# Patient Record
Sex: FEMALE | Race: WHITE | NOT HISPANIC OR LATINO | Employment: FULL TIME | URBAN - NONMETROPOLITAN AREA
[De-identification: names, ages, dates, MRNs, and addresses within clinical notes are randomized per-mention and may not be internally consistent; named-entity substitution may affect disease eponyms.]

---

## 2021-02-10 ENCOUNTER — OCCUPATIONAL MEDICINE (OUTPATIENT)
Dept: URGENT CARE | Facility: PHYSICIAN GROUP | Age: 26
End: 2021-02-10
Payer: COMMERCIAL

## 2021-02-10 VITALS
WEIGHT: 210 LBS | BODY MASS INDEX: 31.83 KG/M2 | HEIGHT: 68 IN | DIASTOLIC BLOOD PRESSURE: 82 MMHG | HEART RATE: 80 BPM | TEMPERATURE: 98 F | RESPIRATION RATE: 16 BRPM | SYSTOLIC BLOOD PRESSURE: 116 MMHG | OXYGEN SATURATION: 96 %

## 2021-02-10 DIAGNOSIS — S90.32XA CONTUSION OF LEFT FOOT, INITIAL ENCOUNTER: ICD-10-CM

## 2021-02-10 DIAGNOSIS — S91.302A WOUND OF LEFT FOOT: ICD-10-CM

## 2021-02-10 PROCEDURE — 99203 OFFICE O/P NEW LOW 30 MIN: CPT | Performed by: FAMILY MEDICINE

## 2021-02-10 RX ORDER — SERTRALINE HYDROCHLORIDE 25 MG/1
25 TABLET, FILM COATED ORAL DAILY
COMMUNITY

## 2021-02-10 RX ORDER — LORATADINE 10 MG/1
10 TABLET ORAL DAILY
COMMUNITY
End: 2022-01-10

## 2021-02-10 NOTE — PROGRESS NOTES
"Subjective:      Daysi Valencia is a 25 y.o. female who presents with Foot Injury (follow up from [place])      DOI 2/8/2021 UMESH: Sindy franco ran over Lt foot and caused a wound. Pain swelling since. Seen initially in [place] ER, says x-rays were done and nothing was broken      HPI    Review of Systems   Musculoskeletal: Positive for joint pain.   All other systems reviewed and are negative.         Objective:     /82 (BP Location: Right arm, Patient Position: Sitting, BP Cuff Size: Adult)   Pulse 80   Temp 36.7 °C (98 °F) (Temporal)   Resp 16   Ht 1.727 m (5' 8\")   Wt 95.3 kg (210 lb)   SpO2 96%   BMI 31.93 kg/m²      Physical Exam  Vitals and nursing note reviewed.   Constitutional:       General: She is not in acute distress.     Appearance: She is well-developed. She is not diaphoretic.   HENT:      Head: Normocephalic.   Cardiovascular:      Heart sounds: Normal heart sounds. No murmur.   Pulmonary:      Effort: Pulmonary effort is normal. No respiratory distress.   Musculoskeletal:        Feet:    Neurological:      Mental Status: She is alert.      Motor: No abnormal muscle tone.   Psychiatric:         Mood and Affect: Mood normal.         Behavior: Behavior normal.         Lt foot: well healing wound over dorsal aspect w/ some surrounding edema.       Assessment/Plan:            1. Contusion of left foot, initial encounter     2. Wound of left foot           ** SEDENTARY DUTY WITH STANDING/WALKING AS NEEDED FOR COMFORT **     Wound care discussed     5 day recheck, sooner if needed, ER if worse.   "

## 2021-02-10 NOTE — LETTER
"EMPLOYEE’S CLAIM FOR COMPENSATION/ REPORT OF INITIAL TREATMENT  FORM C-4    EMPLOYEE’S CLAIM - PROVIDE ALL INFORMATION REQUESTED   First Name  Daysi Last Name  Erik Birthdate                    1995                Sex  female Claim Number   Home Address  [address] Age  25 y.o. 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 (if applicable)  pulling pallets off of truck    How did this injury or occupational disease occur? (Be specific an answer in detail. Use additional sheet if necessary)  I was helping unload a grocery truck and the pallet and pallet salvador ran over my foot.   If you believe that you have an occupational disease, when did you first have knowledge of the disability and it relationship to your employment?  NA Witnesses to the Accident  Crystal, Dalia      Nature of Injury or Occupational Disease  Contusion  Part(s) of Body Injured or Affected  Foot (L), ,     I certify that the above is true and correct to the best of my knowledge and that I have provided this information in order to obtain " the benefits of [place]  I hereby authorize any physician, chiropractor, surgeon, practitioner, or other person, any hospital, including [facility] hospital, any medical service organization, any insurance company, or other institution or organization to release to each other, any medical or other information, including benefits paid or payable, pertinent to this injury or disease, except information relative to diagnosis, treatment and/or counseling for AIDS, psychological conditions, alcohol or controlled substances, for which I must give specific authorization.  A Photostat of this authorization shall be as valid as the original.     Date   Place   Employee’s Signature   THIS REPORT MUST BE COMPLETED AND MAILED WITHIN 3 WORKING DAYS OF TREATMENT   Place  [facility]  Name of Facility  [place]   Date  2/10/2021 Diagnosis  (S90.32XA) Contusion of left foot, initial encounter  (S91.302A) Wound of left foot Is there evidence the injured employee was under the              influence of alcohol and/or another controlled substance at the time of accident?   Hour  10:45 AM Description of Injury or Disease  Diagnoses of Contusion of left foot, initial encounter and Wound of left foot were pertinent to this visit. No   Treatment  Work restrictions  Have you advised the patient to remain off work five days or     more? No   X-Ray Findings      If Yes   From Date  To Date      From information given by the employee, together with medical evidence, can you directly connect this injury or occupational disease as job incurred?  Yes If No Full Duty    No Modified Duty  Yes   Is additional medical care by a physician indicated?  Yes If Modified Duty, Specify any Limitations / Restrictions  ** SEDENTARY DUTY WITH STANDING/WALKING AS NEEDED FOR COMFORT **   Do you know of any previous  "injury or disease contributing to this condition or occupational disease?                            No   Date  2/10/2021 Print Doctor’s Name   Isma Arias M.D. I certify the employer’s copy of  this form was mailed on:   Address  [address] Insurer’s Use Only     [facility]  [address]    Provider’s Tax ID Number  [id number] Telephone  Dept: [phone]      e-ISMA Rice M.D.  Signature:     Degree          ORIGINAL-TREATING PHYSICIAN OR CHIROPRACTOR    PAGE 2-INSURER/TPA    PAGE 3-EMPLOYER    PAGE 4-EMPLOYEE        Form C-4 (rev.10/07)           BRIEF DESCRIPTION OF RIGHTS AND BENEFITS  (Pursuant to [place]RS 616C.050)    Notice of Injury or Occupational Disease (Incident Report Form C-1): If an injury or occupational disease (OD) arises out of and in the course of employment, you must provide written notice to your employer as soon as practicable, but no later than 7 days after the accident or OD. Your employer shall maintain a sufficient supply of the required forms.    Claim for Compensation (Form C-4): If medical treatment is sought, the form C-4 is available at the place of initial treatment. A completed \"Claim for Compensation\" (Form C-4) must be filed within 90 days after an accident or OD. The treating physician or chiropractor must, within 3 working days after treatment, complete and mail to the employer, the employer's insurer and third-party , the Claim for Compensation.    Medical Treatment: If you require medical treatment for your on-the-job injury or OD, you may be required to select a physician or chiropractor from a list provided by your workers’ compensation insurer, if it has contracted with an Organization for Managed Care (MCO) or Preferred Provider Organization (PPO) or providers of health care. If your employer has not entered into a contract with an MCO or PPO, you may select a physician or chiropractor from the Panel of Physicians and " Chiropractors. Any medical costs related to your industrial injury or OD will be paid by your insurer.    Temporary Total Disability (TTD): If your doctor has certified that you are unable to work for a period of at least 5 consecutive days, or 5 cumulative days in a 20-day period, or places restrictions on you that your employer does not accommodate, you may be entitled to TTD compensation.    Temporary Partial Disability (TPD): If the wage you receive upon reemployment is less than the compensation for TTD to which you are entitled, the insurer may be required to pay you TPD compensation to make up the difference. TPD can only be paid for a maximum of 24 months.    Permanent Partial Disability (PPD): When your medical condition is stable and there is an indication of a PPD as a result of your injury or OD, within 30 days, your insurer must arrange for an evaluation by a rating physician or chiropractor to determine the degree of your PPD. The amount of your PPD award depends on the date of injury, the results of the PPD evaluation, your age and wage.    Permanent Total Disability (PTD): If you are medically certified by a treating physician or chiropractor as permanently and totally disabled and have been granted a PTD status by your insurer, you are entitled to receive monthly benefits not to exceed 66 2/3% of your average monthly wage. The amount of your PTD payments is subject to reduction if you previously received a lump-sum PPD award.    Vocational Rehabilitation Services: You may be eligible for vocational rehabilitation services if you are unable to return to the job due to a permanent physical impairment or permanent restrictions as a result of your injury or occupational disease.    Transportation and Per Celsa Reimbursement: You may be eligible for travel expenses and per celsa associated with medical treatment.    Reopening: You may be able to reopen your claim if your condition worsens after claim  closure.     Appeal Process: If you disagree with a written determination issued by the insurer or the insurer does not respond to your request, you may appeal to the Department of Administration, , by following the instructions contained in your determination letter. You must appeal the determination within 70 days from the date of the determination letter at [address], [address], [place], [place] [address], or [address], [address], [place], [place] [address]. If you disagree with the  decision, you may appeal to the Department of Administration, . You must file your appeal within 30 days from the date of the  decision letter at [address], [address], [place], [place] [address], or [address], [address], [place], [place] [address]. If you disagree with a decision of an , you may file a petition for judicial review with the District Court. You must do so within 30 days of the Appeal Officer’s decision. You may be represented by an  at your own expense or you may contact the [facility] for possible representation.    [place]  for Injured Workers (NAIW): If you disagree with a  decision, you may request that NAIW represent you without charge at an  Hearing. For information regarding denial of benefits, you may contact the [facility] at: [address], [address], [place] [address], [phone], or [address], [address], [place] [address], [phone]    To File a Complaint with the Division: If you wish to file a complaint with the  of the Division of Industrial Relations (DIR),  please contact the Workers’ Compensation Section, [address], [place], [place] [address], telephone [phone], or [address], [place], [place] [address], telephone [phone].    For assistance with Workers’  Compensation Issues: You may contact the [facility] Office for Consumer Health Assistance, [facility] [facility], [place] [address], [place] [address], Toll Free 1-428.812.8081, Web site: http://[facility].[place].gov/Programs/AURY E-mail: [email or web address].[facility]              __________________________________________________________________                                    _________________            Employee Name / Signature                                                                                                                            Date                                                                                                                                                                                                                              D-2 (rev. 10/20)

## 2021-02-10 NOTE — LETTER
[facility]  [address] [place] [address] [place] [address]  Phone:  [phone] - Fax:  [phone]   [facility] Progress Report and Disability Certification  Date of Service: 2/10/2021   No Show:  No  Date / Time of Next Visit: 2/15/2021   Claim Information   Patient Name: Daysi Valencia  Claim Number:     Employer: [employer]  Date of Injury: 2/8/2021     Insurer / TPA: [employer]  ID / SSN:     Occupation: Stocking   Diagnosis: Diagnoses of Contusion of left foot, initial encounter and Wound of left foot were pertinent to this visit.    Medical Information   Related to Industrial Injury? Yes    Subjective Complaints:  DOI 2/8/2021 UMESH: Sindy franco ran over Lt foot and caused a wound. Pain swelling since. Seen initially in [place] ER, says x-rays were done and nothing was broken    Objective Findings: Lt foot: well healing wound over dorsal aspect w/ some surrounding edema.   Pre-Existing Condition(s):     Assessment:   Initial Visit    Status: Additional Care Required  Permanent Disability:No    Plan:      Diagnostics:      Comments:       Disability Information   Status: Released to Restricted Duty    From:  2/10/2021  Through: 2/15/2021 Restrictions are: Temporary   Physical Restrictions   Sitting:    Standing:    Stooping:    Bending:      Squatting:    Walking:    Climbing:    Pushing:      Pulling:    Other:    Reaching Above Shoulder (L):   Reaching Above Shoulder (R):       Reaching Below Shoulder (L):    Reaching Below Shoulder (R):      Not to exceed Weight Limits   Carrying(hrs):   Weight Limit(lb):   Lifting(hrs):   Weight  Limit(lb):     Comments: ** SEDENTARY DUTY WITH STANDING/WALKING AS NEEDED FOR COMFORT **    Repetitive Actions   Hands: i.e. Fine Manipulations from Grasping:     Feet: i.e. Operating Foot Controls:     Driving / Operate Machinery:     Provider Name:   Isma Arias M.D. Physician Signature:  Physician Name:      Clinic Name / Location: [address] [place] [address] Clinic Phone Number: Dept: [phone]   Appointment Time: 9:55 Am Visit Start Time: 10:45 AM   Check-In Time:  9:59 Am Visit Discharge Time:  11:13 AM   Original-Treating Physician or Chiropractor    Page 2-Insurer/TPA    Page 3-Employer    Page 4-Employee

## 2021-02-15 ENCOUNTER — OCCUPATIONAL MEDICINE (OUTPATIENT)
Dept: URGENT CARE | Facility: PHYSICIAN GROUP | Age: 26
End: 2021-02-15
Payer: COMMERCIAL

## 2021-02-15 VITALS
DIASTOLIC BLOOD PRESSURE: 74 MMHG | WEIGHT: 210 LBS | BODY MASS INDEX: 31.83 KG/M2 | SYSTOLIC BLOOD PRESSURE: 116 MMHG | TEMPERATURE: 97.6 F | RESPIRATION RATE: 16 BRPM | HEART RATE: 104 BPM | OXYGEN SATURATION: 100 % | HEIGHT: 68 IN

## 2021-02-15 DIAGNOSIS — S90.32XD CONTUSION OF LEFT FOOT, SUBSEQUENT ENCOUNTER: ICD-10-CM

## 2021-02-15 DIAGNOSIS — L03.90 WOUND CELLULITIS: ICD-10-CM

## 2021-02-15 DIAGNOSIS — S91.302A WOUND OF LEFT FOOT: ICD-10-CM

## 2021-02-15 PROCEDURE — 99213 OFFICE O/P EST LOW 20 MIN: CPT | Performed by: PHYSICIAN ASSISTANT

## 2021-02-15 RX ORDER — CEPHALEXIN 500 MG/1
500 CAPSULE ORAL 3 TIMES DAILY
Qty: 30 CAPSULE | Refills: 0 | Status: SHIPPED | OUTPATIENT
Start: 2021-02-15 | End: 2021-02-25

## 2021-02-15 NOTE — PROGRESS NOTES
Chief Complaint   Patient presents with   • Follow-Up       HISTORY OF PRESENT ILLNESS: Patient is a 25 y.o. female who presents today because is here for a Worker's Comp. follow-up visit.    Date of injury 2/8/2021.  A pallet salvador ran over her left foot caused a wound and pain.  She was initially seen at a local emergency room and reports that she had negative x-rays.  She was seen 5 days ago in this urgent care clinic by another provider and was still having some pain in her left foot.  Since her last visit 5 days ago was a lot of pain around an open wound on the top of her left foot, sometimes tingling sensation with certain movements and walking.  She has not been taking any medications for her symptoms    There are no problems to display for this patient.      Allergies:Patient has no known allergies.    Current Outpatient Medications Ordered in Epic   Medication Sig Dispense Refill   • cephALEXin (KEFLEX) 500 MG Cap Take 1 capsule by mouth 3 times a day for 10 days. 30 capsule 0   • sertraline (ZOLOFT) 25 MG tablet Take 25 mg by mouth every day.     • loratadine (CLARITIN) 10 MG Tab Take 10 mg by mouth every day.       No current [facility]-ordered facility-administered medications on file.       History reviewed. No pertinent past medical history.    Social History     Tobacco Use   • Smoking status: Never Smoker   • Smokeless tobacco: Never Used   Substance Use Topics   • Alcohol use: Not on file   • Drug use: Not on file       No family status information on file.   History reviewed. No pertinent family history.    ROS:  Review of Systems   Constitutional: Negative for fever, chills, weight loss and malaise/fatigue.   HENT: Negative for ear pain, nosebleeds, congestion, sore throat and neck pain.    Eyes: Negative for blurred vision.   Respiratory: Negative for cough, sputum production, shortness of breath and wheezing.    Cardiovascular: Negative for chest pain, palpitations, orthopnea and leg swelling.  "  Gastrointestinal: Negative for heartburn, nausea, vomiting and abdominal pain.   Genitourinary: Negative for dysuria, urgency and frequency.     Exam:  /74 (BP Location: Right arm, Patient Position: Sitting, BP Cuff Size: Large adult)   Pulse (!) 104   Temp 36.4 °C (97.6 °F) (Temporal)   Resp 16   Ht 1.727 m (5' 8\")   Wt 95.3 kg (210 lb)   SpO2 100%   General:  Well nourished, well developed female in NAD  Head:Normocephalic, atraumatic  Eyes: PERRLA, EOM within normal limits, no conjunctival injection, no scleral icterus, visual fields and acuity grossly intact.  Nose: Symmetrical without tenderness, no discharge.  Mouth: reasonable hygiene, no erythema exudates or tonsillar enlargement.  Neck: no masses, range of motion within normal limits, no tracheal deviation. No obvious thyroid enlargement.  Extremities: no clubbing, cyanosis.  There is a 2 cm x 2 cm diameter area of open wound on the top of her left foot with some surrounding edema and erythema and exquisite tenderness.  There is a small amount of yellow drainage.  Distally she has good range of motion, strength, circulation and sensation.    Please note that this dictation was created using voice recognition software. I have made every reasonable attempt to correct obvious errors, but I expect that there are errors of grammar and possibly content that I did not discover before finalizing the note.    Assessment/Plan:  1. Contusion of left foot, subsequent encounter     2. Wound of left foot     3. Wound cellulitis  cephALEXin (KEFLEX) 500 MG Cap       Wound was cleaned and dressed in the office, wound care instructions were given.  Will cover for cellulitis, continue restrictions, follow-up in a week  Use over-the-counter ibuprofen as needed    "

## 2021-02-15 NOTE — LETTER
[facility]  [address] [place] [address] [place] [address]  Phone:  [phone] - Fax:  [phone]   [facility]ational [facility] Progress Report and Disability Certification  Date of Service: 2/15/2021   No Show:  No  Date / Time of Next Visit: 2021   Claim Information   Patient Name: Daysi Valencia  Claim Number:     Employer: [employer]  Date of Injury: 2021     Insurer / TPA: [employer]  ID / SSN:     Occupation: Stocking   Diagnosis: Diagnoses of Contusion of left foot, subsequent encounter, Wound of left foot, and Wound cellulitis were pertinent to this visit.    Medical Information   Related to Industrial Injury? Yes    Subjective Complaints:  Continuing pain and wound of left foot   Objective Findings:  There is a 2 cm x 2 cm diameter area of open wound on the top of her left foot with some surrounding edema and erythema and exquisite tenderness.  There is a small amount of yellow drainage.  Distally she has good range of motion, strength, circulation and sensation.     Pre-Existing Condition(s):     Assessment:   Condition Worsened    Status: Additional Care Required  Comments:Follow-up in 1 week  Permanent Disability:No    Plan: Medication  Comments:Prescription antibiotics, over-the-counter anti-inflammatories    Diagnostics:      Comments:       Disability Information   Status: Released to Restricted Duty    From:  2/15/2021  Through: 2021 Restrictions are:     Physical Restrictions   Sitting:    Standing:    Stooping:    Bending:      Squatting:    Walking:  < or = to 1 hr/day Climbin hrs/day Pushin hrs/day   Pullin hrs/day Other:    Reaching Above Shoulder (L):   Reaching Above Shoulder (R):       Reaching Below Shoulder (L):    Reaching Below Shoulder (R):      Not to exceed Weight Limits   Carrying(hrs):   Weight Limit(lb):   Lifting(hrs):   Weight  Limit(lb):     Comments: Minimal walking.  Walking only as tolerated.   Otherwise sedentary/sitting only    Repetitive Actions   Hands: i.e. Fine Manipulations from Grasping:     Feet: i.e. Operating Foot Controls:     Driving / Operate Machinery:     Provider Name:   Law Abreu P.A.-C. Physician Signature:  Physician Name:     Clinic Name / Location: [address] [address] Clinic Phone Number: Dept: [phone]   Appointment Time: 9:20 Am Visit Start Time: 9:16 AM   Check-In Time:  9:14 Am Visit Discharge Time:  9:47 AM   Original-Treating Physician or Chiropractor    Page 2-Insurer/TPA    Page 3-Employer    Page 4-Employee

## 2021-02-22 ENCOUNTER — OCCUPATIONAL MEDICINE (OUTPATIENT)
Dept: URGENT CARE | Facility: PHYSICIAN GROUP | Age: 26
End: 2021-02-22
Payer: COMMERCIAL

## 2021-02-22 VITALS
OXYGEN SATURATION: 98 % | SYSTOLIC BLOOD PRESSURE: 116 MMHG | HEART RATE: 92 BPM | TEMPERATURE: 97.6 F | BODY MASS INDEX: 31.83 KG/M2 | HEIGHT: 68 IN | WEIGHT: 210 LBS | DIASTOLIC BLOOD PRESSURE: 84 MMHG | RESPIRATION RATE: 16 BRPM

## 2021-02-22 DIAGNOSIS — L03.90 WOUND CELLULITIS: ICD-10-CM

## 2021-02-22 DIAGNOSIS — S90.32XD CONTUSION OF LEFT FOOT, SUBSEQUENT ENCOUNTER: ICD-10-CM

## 2021-02-22 PROCEDURE — 99213 OFFICE O/P EST LOW 20 MIN: CPT | Performed by: PHYSICIAN ASSISTANT

## 2021-02-22 NOTE — PROGRESS NOTES
Chief Complaint   Patient presents with   • Follow-Up       HISTORY OF PRESENT ILLNESS: Patient is a 25 y.o. female who presents today because she is here for a Worker's Comp. follow-up visit.    Date of injury 2/8/2021. A pallet salvador ran over her left foot caused a wound and pain. She was initially seen at a local emergency room and reports that she had negative x-rays. She was seen 1 week ago in this urgent care clinic and having worsening pain around the open area on the top of her left foot.  She was treated for wound cellulitis.  Since her last visit 1 week ago, she has had improvement in her pain but wound areas still open, no longer draining, no longer red      There are no problems to display for this patient.      Allergies:Patient has no known allergies.    Current Outpatient Medications Ordered in Epic   Medication Sig Dispense Refill   • cephALEXin (KEFLEX) 500 MG Cap Take 1 capsule by mouth 3 times a day for 10 days. 30 capsule 0   • sertraline (ZOLOFT) 25 MG tablet Take 25 mg by mouth every day.     • loratadine (CLARITIN) 10 MG Tab Take 10 mg by mouth every day.       No current [facility]-ordered facility-administered medications on file.       History reviewed. No pertinent past medical history.    Social History     Tobacco Use   • Smoking status: Never Smoker   • Smokeless tobacco: Never Used   Substance Use Topics   • Alcohol use: Not on file   • Drug use: Not on file       No family status information on file.   History reviewed. No pertinent family history.    ROS:  Review of Systems   Constitutional: Negative for fever, chills, weight loss and malaise/fatigue.   HENT: Negative for ear pain, nosebleeds, congestion, sore throat and neck pain.    Eyes: Negative for blurred vision.   Respiratory: Negative for cough, sputum production, shortness of breath and wheezing.    Cardiovascular: Negative for chest pain, palpitations, orthopnea and leg swelling.   Gastrointestinal: Negative for heartburn, nausea,  "vomiting and abdominal pain.   Genitourinary: Negative for dysuria, urgency and frequency.     Exam:  /84 (BP Location: Right arm, Patient Position: Sitting, BP Cuff Size: Adult)   Pulse 92   Temp 36.4 °C (97.6 °F) (Temporal)   Resp 16   Ht 1.727 m (5' 8\")   Wt 95.3 kg (210 lb)   SpO2 98%   General:  Well nourished, well developed female in NAD  Head:Normocephalic, atraumatic  Eyes: PERRLA, EOM within normal limits, no conjunctival injection, no scleral icterus, visual fields and acuity grossly intact.  Nose: Symmetrical without tenderness, no discharge.  Mouth: reasonable hygiene, no erythema exudates or tonsillar enlargement.  Neck: no masses, range of motion within normal limits, no tracheal deviation. No obvious thyroid enlargement.  Extremities: no clubbing, cyanosis, or edema.  Top of her left foot she still has a 2 cm x 2 cm diameter area of open wound, no surrounding erythema, minimal surrounding tenderness.    Please note that this dictation was created using voice recognition software. I have made every reasonable attempt to correct obvious errors, but I expect that there are errors of grammar and possibly content that I did not discover before finalizing the note.    Assessment/Plan:  1. Contusion of left foot, subsequent encounter     2. Wound cellulitis         Continue current, continue current restrictions, follow-up in a week       "

## 2021-02-22 NOTE — LETTER
[facility]  [address] [place] [address] [place] [address]  Phone:  [phone] - Fax:  [phone]   [facility] Progress Report and Disability Certification  Date of Service: 2/22/2021   No Show:  No  Date / Time of Next Visit: 3/1/2021   Claim Information   Patient Name: Daysi Valencia  Claim Number:     Employer: [employer]  Date of Injury: 2/8/2021     Insurer / TPA: [employer]  ID / SSN:     Occupation: Stocking   Diagnosis: Diagnoses of Contusion of left foot, subsequent encounter and Wound cellulitis were pertinent to this visit.    Medical Information   Related to Industrial Injury? Yes    Subjective Complaints:  Improving left foot wound and contusion   Objective Findings: Extremities: no clubbing, cyanosis, or edema.  Top of her left foot she still has a 2 cm x 2 cm diameter area of open wound, no surrounding erythema, minimal surrounding tenderness.   Pre-Existing Condition(s):     Assessment:   Condition Improved    Status: Additional Care Required  Comments:Follow-up in 1 week  Permanent Disability:No    Plan:      Diagnostics:      Comments:       Disability Information   Status: Released to Restricted Duty    From:  2/22/2021  Through: 3/1/2021 Restrictions are: Temporary   Physical Restrictions   Sitting:    Standing:    Stooping:    Bending:      Squatting:    Walking:    Climbing:    Pushing:      Pulling:    Other:    Reaching Above Shoulder (L):   Reaching Above Shoulder (R):       Reaching Below Shoulder (L):    Reaching Below Shoulder (R):      Not to exceed Weight Limits   Carrying(hrs):   Weight Limit(lb):   Lifting(hrs):   Weight  Limit(lb):     Comments: Primarily sedentary work only, walking and standing only as tolerated    Repetitive Actions   Hands: i.e. Fine Manipulations from Grasping:     Feet: i.e. Operating Foot Controls:     Driving / Operate Machinery:     Provider Name:   Law Abreu P.A.-C. Physician  Signature:  Physician Name:     Clinic Name / Location: [facility] [address] [place] [address] Clinic Phone Number: Dept: [phone]   Appointment Time: 9:20 Am Visit Start Time: 9:04 AM   Check-In Time:  9:04 Am Visit Discharge Time:  9:52 am   Original-Treating Physician or Chiropractor    Page 2-Insurer/TPA    Page 3-Employer    Page 4-Employee

## 2021-03-01 ENCOUNTER — OCCUPATIONAL MEDICINE (OUTPATIENT)
Dept: OCCUPATIONAL MEDICINE | Facility: CLINIC | Age: 26
End: 2021-03-01
Payer: COMMERCIAL

## 2021-03-01 VITALS
SYSTOLIC BLOOD PRESSURE: 118 MMHG | HEART RATE: 82 BPM | BODY MASS INDEX: 30.06 KG/M2 | RESPIRATION RATE: 14 BRPM | DIASTOLIC BLOOD PRESSURE: 74 MMHG | TEMPERATURE: 98.6 F | HEIGHT: 70 IN | OXYGEN SATURATION: 95 % | WEIGHT: 210 LBS

## 2021-03-01 DIAGNOSIS — L03.90 WOUND CELLULITIS: ICD-10-CM

## 2021-03-01 DIAGNOSIS — S90.32XD CONTUSION OF LEFT FOOT, SUBSEQUENT ENCOUNTER: ICD-10-CM

## 2021-03-01 DIAGNOSIS — S91.302D WOUND, OPEN, FOOT WITH COMPLICATION, LEFT, SUBSEQUENT ENCOUNTER: ICD-10-CM

## 2021-03-01 PROBLEM — S90.122A CONTUSION OF FIFTH TOE OF LEFT FOOT: Status: ACTIVE | Noted: 2021-03-01

## 2021-03-01 PROCEDURE — 99214 OFFICE O/P EST MOD 30 MIN: CPT | Performed by: NURSE PRACTITIONER

## 2021-03-01 RX ORDER — SULFAMETHOXAZOLE AND TRIMETHOPRIM 800; 160 MG/1; MG/1
1 TABLET ORAL 2 TIMES DAILY
Qty: 10 TABLET | Refills: 0 | Status: SHIPPED | OUTPATIENT
Start: 2021-03-01 | End: 2021-03-06

## 2021-03-01 ASSESSMENT — PAIN SCALES - GENERAL: PAINLEVEL: 3=SLIGHT PAIN
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
## 2021-03-08 ENCOUNTER — OCCUPATIONAL MEDICINE (OUTPATIENT)
Dept: OCCUPATIONAL MEDICINE | Facility: CLINIC | Age: 26
End: 2021-03-08
Payer: COMMERCIAL

## 2021-03-08 VITALS
BODY MASS INDEX: 34.71 KG/M2 | DIASTOLIC BLOOD PRESSURE: 88 MMHG | HEIGHT: 68 IN | WEIGHT: 229 LBS | SYSTOLIC BLOOD PRESSURE: 112 MMHG | HEART RATE: 74 BPM | TEMPERATURE: 97.5 F | OXYGEN SATURATION: 97 %

## 2021-03-08 DIAGNOSIS — L03.90 WOUND CELLULITIS: ICD-10-CM

## 2021-03-08 DIAGNOSIS — S91.302D WOUND, OPEN, FOOT WITH COMPLICATION, LEFT, SUBSEQUENT ENCOUNTER: ICD-10-CM

## 2021-03-08 DIAGNOSIS — S90.32XD CONTUSION OF LEFT FOOT, SUBSEQUENT ENCOUNTER: ICD-10-CM

## 2021-03-08 PROCEDURE — 99213 OFFICE O/P EST LOW 20 MIN: CPT | Performed by: NURSE PRACTITIONER

## 2021-03-08 RX ORDER — DOXYCYCLINE HYCLATE 100 MG
100 TABLET ORAL 2 TIMES DAILY
Qty: 14 TABLET | Refills: 0 | Status: SHIPPED | OUTPATIENT
Start: 2021-03-08 | End: 2021-03-15

## 2021-03-08 ASSESSMENT — ENCOUNTER SYMPTOMS
PSYCHIATRIC NEGATIVE: 1
CARDIOVASCULAR NEGATIVE: 1
RESPIRATORY NEGATIVE: 1
NEUROLOGICAL NEGATIVE: 1
MYALGIAS: 1
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
## 2021-03-09 ENCOUNTER — NON-PROVIDER VISIT (OUTPATIENT)
Dept: WOUND CARE | Facility: MEDICAL CENTER | Age: 26
End: 2021-03-09
Attending: NURSE PRACTITIONER
Payer: COMMERCIAL

## 2021-03-09 PROCEDURE — 99211 OFF/OP EST MAY X REQ PHY/QHP: CPT

## 2021-03-09 PROCEDURE — 97597 DBRDMT OPN WND 1ST 20 CM/<: CPT

## 2021-03-09 NOTE — PATIENT INSTRUCTIONS
Rinse wound bed with normal saline then pat dry with gauze. Apply skin prep to skin around the wound and where tape will go on. Apply collagen dressing (Anastasiia) moistened with normal saline to wound bed as directed by provider. Cover wound with silver hydrofiber dressing (Aquacle Ag Advantage) then foam (foam side down to wound) with tape and change dressing every 2~3 days or if it becomes over saturated, soiled or falls off.    Keep dressing clean and dry and cover while bathing. Only change dressing if over saturated, soiled or its falling off.     Should you experience any significant changes in your wound(s) such as infection (redness, swelling, localized heat, increased pain, fever >101 F, chills) or have any questions regarding your home care instructions, please contact the wound center [phone]. If after hours, contact your primary care physician or go the hospital emergency room.
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
## 2021-03-18 ENCOUNTER — OFFICE VISIT (OUTPATIENT)
Dept: WOUND CARE | Facility: MEDICAL CENTER | Age: 26
End: 2021-03-18
Attending: NURSE PRACTITIONER
Payer: COMMERCIAL

## 2021-03-18 VITALS
TEMPERATURE: 97.4 F | SYSTOLIC BLOOD PRESSURE: 115 MMHG | DIASTOLIC BLOOD PRESSURE: 72 MMHG | RESPIRATION RATE: 20 BRPM | OXYGEN SATURATION: 100 % | HEART RATE: 83 BPM

## 2021-03-18 DIAGNOSIS — R60.0 EDEMA OF LEFT FOOT: ICD-10-CM

## 2021-03-18 DIAGNOSIS — R52 PAIN ASSOCIATED WITH WOUND: ICD-10-CM

## 2021-03-18 DIAGNOSIS — S91.302D OPEN WOUND OF LEFT FOOT, SUBSEQUENT ENCOUNTER: Primary | ICD-10-CM

## 2021-03-18 DIAGNOSIS — T14.8XXA PAIN ASSOCIATED WITH WOUND: ICD-10-CM

## 2021-03-18 PROCEDURE — 11042 DBRDMT SUBQ TIS 1ST 20SQCM/<: CPT | Performed by: NURSE PRACTITIONER

## 2021-03-18 PROCEDURE — 11042 DBRDMT SUBQ TIS 1ST 20SQCM/<: CPT

## 2021-03-18 PROCEDURE — 99213 OFFICE O/P EST LOW 20 MIN: CPT

## 2021-03-18 ASSESSMENT — PAIN SCALES - GENERAL: PAINLEVEL: 5=MODERATE PAIN

## 2021-03-18 NOTE — PROGRESS NOTES
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  She was seen at [facility] in [place].  An x-ray was done, no fractures.  Followed up with occupational health multiple times, was prescribed Keflex, and later Bactrim and then doxycycline.  He was also advised to apply antibiotic ointment and Band-Aids to wound on the dorsum of her foot.  Her wound failed to progress, and she was referred to [facility] for management..      Pertinent Medical History: No significant past medical history    TOBACCO USE: She has never smoked or use smokeless tobacco    Patient's problem list, allergies, and current medications reviewed and updated in Epic    Interval History:  3/18/2021: Initial provider visit with LUIS Clement.  Patient states she is feeling well, denies fevers, chills, nausea, vomiting, cough or shortness of breath.  She has been working, wearing regular shoe.  Reports she does not have significant pain from her wound.  She feels it has improved since beginning wound care.      REVIEW OF SYSTEMS:   Review of Systems   Constitutional: Negative for fever.   Respiratory: Negative for cough and shortness of breath.    Cardiovascular: Negative for claudication and leg swelling.   Gastrointestinal: Negative for constipation, diarrhea, nausea and vomiting.   Musculoskeletal: Negative for joint pain.   Skin: Negative for itching and rash.   Psychiatric/Behavioral: Negative for depression. The patient is not nervous/anxious.        PHYSICAL EXAMINATION:   /72   Pulse 83   Temp 36.3 °C (97.4 °F)   Resp 20   SpO2 100%     Physical Exam   Constitutional: She is oriented to person, place, and time and well-developed,  well-nourished, and in no distress.   Obese   HENT:   Head: Normocephalic.   Eyes: Pupils are equal, round, and reactive to light.   Cardiovascular: Intact distal pulses.   Pulmonary/Chest: Effort normal.   Musculoskeletal:         General: Edema present. Normal range of motion.      Comments: Edema dorsum of left foot, nonpitting   Neurological: She is alert and oriented to person, place, and time.   Skin: Skin is warm.   Full-thickness wound to dorsum of left foot  Refer to wound flowsheet and photos   Psychiatric: Mood, memory, affect and judgment normal.       WOUND ASSESSMENT     Wound 03/09/21 Traumatic Left Dorsal Foot (Active)   Wound Image    03/18/21 1400   Site Assessment Yellow;Red 03/18/21 1400   Periwound Assessment Hemosiderin Staining;Scar tissue 03/18/21 1400   Margins Unattached edges 03/18/21 1400   Closure Secondary intention 03/09/21 1030   Drainage Amount Small 03/18/21 1400   Drainage Description Serosanguineous 03/18/21 1400   Treatments Cleansed;Topical Lidocaine;Provider debridement 03/18/21 1400   Wound Cleansing Normal Saline Irrigation 03/18/21 1400   Periwound Protectant Skin Protectant Wipes to Periwound;Barrier Paste 03/18/21 1400   Dressing Cleansing/Solutions Normal Saline 03/18/21 1400   Dressing Options Collagen Dressing;Hydrofiber Silver;Nonadhesive Foam;Hypafix Tape 03/18/21 1400   Dressing Changed New 03/18/21 1400   Dressing Status Clean;Intact;Dry 03/18/21 1400   Dressing Change/Treatment Frequency Every 48 hrs, and As Needed 03/18/21 1400   Non-staged Wound Description Full thickness 03/18/21 1400   Wound Length (cm) 0.8 cm 03/18/21 1400   Wound Width (cm) 1 cm 03/18/21 1400   Wound Depth (cm) 0.1 cm 03/18/21 1400   Wound Surface Area (cm^2) 0.8 cm^2 03/18/21 1400   Wound Volume (cm^3) 0.08 cm^3 03/18/21 1400   Post-Procedure Length (cm) 0.9 cm 03/18/21 1400   Post-Procedure Width (cm) 1 cm 03/18/21 1400   Post-Procedure Depth (cm) 0.2 cm 03/18/21 1400   Post-Procedure  Surface Area (cm^2) 0.9 cm^2 03/18/21 1400   Post-Procedure Volume (cm^3) 0.18 cm^3 03/18/21 1400   Wound Bed Slough % - (Post-Procedure) 10 % 03/09/21 1030   Tunneling (cm) 0 cm 03/18/21 1400   Undermining (cm) 0 cm 03/18/21 1400   Wound Odor None 03/18/21 1400   Pulses Left;DP;PT;2+ 03/09/21 1030   Exposed Structures None 03/18/21 1400        PROCEDURE:   -2% viscous lidocaine applied topically to wound bed for approximately 5 minutes prior to debridement  -Curette used to debride wound bed.  Excisional debridement was performed to remove devitalized tissue until healthy, bleeding tissue was visualized.   Entire surface of wound, 0.9 cm2 debrided.  Tissue debrided into the subcutaneous layer.    -Bleeding controlled with manual pressure.    -Wound care completed by wound RN, refer to flowsheet  -Patient tolerated the procedure well, without c/o pain or discomfort.       Pertinent Labs and Diagnostics:    Labs:     A1c: No results found for: HBA1C       IMAGING: None found in epic.  X-[facility] from [facility] reportedly negative for fracture    VASCULAR STUDIES: N/A    LAST  WOUND CULTURE:  DATE : None found in epic            ASSESSMENT AND PLAN:     1. Open wound of left foot, subsequent encounter  Comments: Full-thickness wound caused by trauma, patient pulled pallet salvador onto her foot.    3/18/2021: Wound area has decreased since last assessment.  Thin layer of slough on wound bed  -Excisional debridement of wound in clinic today, medically necessary to promote wound healing.  -Patient to return to clinic weekly for assessment and debridement  -Patient to change dressing 1-2 times per week in between clinic visits  -Patient's age and apparent good health, anticipate full resolution of this wound within 14 to 21 days    Wound care: Collagen to accelerate granulation, Hydrofiber silver to manage bioburden and exudate, foam cover dressing, Hypafix tape    2. Edema of left foot    3/18/2021: Nonpitting  edema to dorsum of left foot  -Tubigrip to manage edema    3. Pain associated with wound    3/18/2021: Patient reports little pain from her wound, except with wound care  -2% viscous lidocaine applied topically to wound bed for approximately 5 minutes prior to debridement  -Patient tolerated procedure today with no complaints of discomfort.          PATIENT EDUCATION  - Importance of adequate nutrition for wound healing  -Advised to go to ER for any increased redness, swelling, drainage, or odor, or if patient develops fever, chills, nausea or vomiting.     20 min spent face to face with patient, >50% of time spent counseling, coordinating care, reviewing records, discussing POC, educating patient regarding wound healing and progression.  This time was spent in excess to procedure time.       Please note that this note may have been created using voice recognition software. I have worked with technical experts from [facility] [employer] to optimize the interface.  I have made every reasonable attempt to correct obvious errors, but there may be errors of grammar and possibly content that I did not discover before finalizing the note.    N

## 2021-03-19 ASSESSMENT — ENCOUNTER SYMPTOMS
DIARRHEA: 0
COUGH: 0
DEPRESSION: 0
VOMITING: 0
CONSTIPATION: 0
SHORTNESS OF BREATH: 0
NERVOUS/ANXIOUS: 0
NAUSEA: 0
CLAUDICATION: 0
FEVER: 0

## 2021-03-22 ENCOUNTER — OCCUPATIONAL MEDICINE (OUTPATIENT)
Dept: OCCUPATIONAL MEDICINE | Facility: CLINIC | Age: 26
End: 2021-03-22
Payer: COMMERCIAL

## 2021-03-22 VITALS
BODY MASS INDEX: 34.71 KG/M2 | OXYGEN SATURATION: 98 % | HEIGHT: 68 IN | HEART RATE: 91 BPM | DIASTOLIC BLOOD PRESSURE: 64 MMHG | SYSTOLIC BLOOD PRESSURE: 118 MMHG | WEIGHT: 229 LBS | TEMPERATURE: 96.4 F

## 2021-03-22 DIAGNOSIS — S91.302D WOUND, OPEN, FOOT WITH COMPLICATION, LEFT, SUBSEQUENT ENCOUNTER: ICD-10-CM

## 2021-03-22 DIAGNOSIS — L03.90 WOUND CELLULITIS: ICD-10-CM

## 2021-03-22 DIAGNOSIS — S90.32XD CONTUSION OF LEFT FOOT, SUBSEQUENT ENCOUNTER: ICD-10-CM

## 2021-03-22 PROCEDURE — 99213 OFFICE O/P EST LOW 20 MIN: CPT | Performed by: NURSE PRACTITIONER

## 2021-03-22 ASSESSMENT — ENCOUNTER SYMPTOMS
FEVER: 0
CONSTITUTIONAL NEGATIVE: 1
MYALGIAS: 1
PSYCHIATRIC NEGATIVE: 1
NEUROLOGICAL NEGATIVE: 1
RESPIRATORY NEGATIVE: 1
CARDIOVASCULAR NEGATIVE: 1

## 2021-03-22 NOTE — LETTER
[address],   [address] [address]  Phone:  [phone] - Fax:  [phone]   [facility] Progress Report and Disability Certification  Date of Service: 3/22/2021   No Show:  No  Date / Time of Next Visit: 4/12/2021 @ 7:30 AM    Claim Information   Patient Name: Daysi Valencia Jr.  Claim Number:     Employer: [employer]  Date of Injury: 2/8/2021     Insurer / TPA: [employer]  ID / SSN:     Occupation: Stocking   Diagnosis: Diagnoses of Wound, open, foot with complication, left, subsequent encounter, Wound cellulitis, and Contusion of left foot, subsequent encounter were pertinent to this visit.    Medical Information   Related to Industrial Injury? Yes    Subjective Complaints:  Date of injury 2/8/2021. A pallet jack ran over her left foot caused a wound and pain. Today gradual overall improvement.  She states that she continues to have mild discomfort with ambulating or wearing shoes.  She denies numbness, tingling, fever, significant swelling, or warmth.  Patient has completed Bactrim, doxycycline, and Keflex without notable adverse effects.  She is no longer using ibuprofen. She is able to bear weight with some discomfort noted.  She is tolerating light duty without difficulty.  Wound care is very helpful. Plan of care discussed with patient.   Objective Findings: Left foot: Top of her left foot she still has a 1.5 cm x 1.5 cm diameter area of open wound, no surrounding erythema, minimal surrounding tenderness.  Distal pulses 2+ intact.  Slight gait abnormality.  Distal pulses 2+ intact.  No gait abnormality.    Pre-Existing Condition(s):     Assessment:   Condition Improved    Status: Additional Care Required  Permanent Disability:No    Plan: Consultation    Diagnostics:      Comments:   Follow-up in 3 weeks for wound check   Restricted duty   Recommend OTC Tylenol/ibuprofen as needed   Wound  clinic appointments as scheduled..    *Patient understands return to ED for immediate evaluation with signs and symptoms of infection i.e. sev  ere redness, streaking, swelling, foul discharge, severe pain, or severe warmth.    Disability Information   Status: Released to Restricted Duty    From:  3/22/2021  Through: 2021 Restrictions are: Temporary   Physical Restrictions   Sitting:    Standing:    Stooping:    Bending:      Squatting:    Walking:  < or = to 1 hr/day Climbin hrs/day Pushin hrs/day   Pullin hrs/day Other:    Reaching Above Shoulder (L):   Reaching Above Shoulder (R):       Reaching Below Shoulder (L):    Reaching Below Shoulder (R):      Not to exceed Weight Limits   Carrying(hrs):   Weight Limit(lb):   Lifting(hrs):   Weight  Limit(lb):     Comments: Minimal walking.  Walking only as tolerated.  Otherwise sedentary/sitting only    Repetitive Actions   Hands: i.e. Fine Manipulations from Grasping:     Feet: i.e. Operating Foot Controls:     Driving / Operate Machinery:     Provider Name:   SANJUANA Reynolds Physician Signature:  Physician Name:     Clinic Name / Location: [address],   [address]  [place] [address] Clinic Phone Number: Dept: [phone]   Appointment Time: 7:30 Am Visit Start Time: 7:39 AM   Check-In Time:  7:34 Am Visit Discharge Time: 8 AM    Original-Treating Physician or Chiropractor    Page 2-Insurer/TPA    Page 3-Employer    Page 4-Employee

## 2021-03-22 NOTE — PROGRESS NOTES
"Subjective:     Daysi Valencia Jr. is a 25 y.o. female who presents for Other (WC DOI 2/8/2021 left foot -feeling better- room 16)      Date of injury 2/8/2021. A pallet salvador ran over her left foot caused a wound and pain. Today gradual overall improvement.  She states that she continues to have mild discomfort with ambulating or wearing shoes.  She denies numbness, tingling, fever, significant swelling, or warmth.  Patient has completed Bactrim, doxycycline, and Keflex without notable adverse effects.  She is no longer using ibuprofen. She is able to bear weight with some discomfort noted.  She is tolerating light duty without difficulty.  Wound care is very helpful. Plan of care discussed with patient.    Review of Systems   Constitutional: Negative.  Negative for fever.   Respiratory: Negative.    Cardiovascular: Negative.    Musculoskeletal: Positive for myalgias. Negative for joint pain.   Skin:        1.5 cmx 1.5cm healing open wound dorsal left foot   Neurological: Negative.    Psychiatric/Behavioral: Negative.        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       Objective:     /64   Pulse 91   Temp (!) 35.8 °C (96.4 °F)   Ht 1.727 m (5' 8\")   Wt 104 kg (229 lb)   SpO2 98%   BMI 34.82 kg/m²     Constitutional: Patient is in no acute distress. Appears well-developed and well-nourished.   Cardiovascular: Normal rate.    Pulmonary/Chest: Effort normal. No respiratory distress.   Neurological: Patient is alert and oriented to person, place, and time.   Skin: Skin is warm and dry.   Psychiatric: Normal mood and affect. Behavior is normal.     Left foot: Top of her left foot she still has a 1.5 cm x 1.5 cm diameter area of open wound, no surrounding erythema, minimal surrounding tenderness.  Distal pulses 2+ intact.  Slight gait abnormality.  Distal pulses 2+ intact.  No gait abnormality.     Assessment/Plan:       1. Wound, open, foot with " complication, left, subsequent encounter    2. Wound cellulitis    3. Contusion of left foot, subsequent encounter    Released to Restricted Duty FROM 3/22/2021 TO 4/12/2021  Minimal walking.  Walking only as tolerated.  Otherwise sedentary/sitting only     Follow-up in 3 weeks for wound check   Restricted duty   Recommend OTC Tylenol/ibuprofen as needed   Wound clinic appointments as scheduled..    *Patient understands return to ED for immediate evaluation with signs and symptoms of infection i.e. sev  ere redness, streaking, swelling, foul discharge, severe pain, or severe warmth.    Differential diagnosis, natural history, supportive care, and indications for immediate follow-up discussed.    Approximately 25 minutes was spent in preparing for visit, obtaining history, exam and evaluation, patient counseling/education and post visit documentation/orders.

## 2021-03-25 ENCOUNTER — NON-PROVIDER VISIT (OUTPATIENT)
Dept: WOUND CARE | Facility: MEDICAL CENTER | Age: 26
End: 2021-03-25
Attending: NURSE PRACTITIONER
Payer: COMMERCIAL

## 2021-03-25 PROCEDURE — 97597 DBRDMT OPN WND 1ST 20 CM/<: CPT

## 2021-03-25 NOTE — PROCEDURES
Wound was selectively debrided with a curette and tweezers  to remove sung wound callus and non viable tissue from wound bed. Area debrided <20cm2. Pt epifanio well.

## 2021-03-25 NOTE — PATIENT INSTRUCTIONS
Should you experience any significant changes in your wound(s) such as infection (redness, swelling, localized heat, increased pain, fever >101 F, chills) or have any questions regarding your home care instructions, please contact the wound center [phone]. If after hours, contact your primary care physician or go the hospital emergency room.  Keep dressing clean and dry and cover while bathing. Only change dressing if over saturated, soiled or its falling off.

## 2021-04-01 ENCOUNTER — OFFICE VISIT (OUTPATIENT)
Dept: WOUND CARE | Facility: MEDICAL CENTER | Age: 26
End: 2021-04-01
Attending: NURSE PRACTITIONER
Payer: COMMERCIAL

## 2021-04-01 VITALS
OXYGEN SATURATION: 98 % | HEART RATE: 98 BPM | RESPIRATION RATE: 18 BRPM | DIASTOLIC BLOOD PRESSURE: 75 MMHG | TEMPERATURE: 98.1 F | SYSTOLIC BLOOD PRESSURE: 127 MMHG

## 2021-04-01 DIAGNOSIS — S91.302D OPEN WOUND OF LEFT FOOT, SUBSEQUENT ENCOUNTER: ICD-10-CM

## 2021-04-01 PROCEDURE — 99211 OFF/OP EST MAY X REQ PHY/QHP: CPT

## 2021-04-01 ASSESSMENT — PAIN SCALES - GENERAL: PAINLEVEL: NO PAIN

## 2021-04-01 NOTE — CERTIFICATION
Advanced Wound Care   [facility]   [address]   [address]   [place] [address]   [phone] Fax: [phone]     Discharge Note        Referring Physician:  Anthony SMITH  Wound Etiology: Trauma  Wound location: Left dorsal foot  ICD-10: S91.302D (ICD-10-CM) - Wound, open, foot with complication, left, subsequent encounter  Date of Discharge: 04/01/2021     Assessment:  Discharge patient at this time secondary to wound resolution. Educated pt on maturation process of wound healing and how it will take several months up to more than a year to fully heal, which will be about 80% tensile strength of what it was. Instructed pt to continue gentle care, protect the wound site, avoid sunlight, apply sun block if exposed to sun, and apply moisturizer daily. Pt verbalized understanding to all.    Thank you for the referral and the opportunity to treat your patient.

## 2021-04-01 NOTE — LETTER
April 1, 2021        Daysi Valencia Jr.    Daysi Valencia' wound is resolved and she is discharged from [facility] [place].                            Sunny Ceron R.N.

## 2021-04-06 ENCOUNTER — OCCUPATIONAL MEDICINE (OUTPATIENT)
Dept: OCCUPATIONAL MEDICINE | Facility: CLINIC | Age: 26
End: 2021-04-06
Payer: COMMERCIAL

## 2021-04-06 VITALS
DIASTOLIC BLOOD PRESSURE: 78 MMHG | HEIGHT: 68 IN | HEART RATE: 70 BPM | SYSTOLIC BLOOD PRESSURE: 128 MMHG | TEMPERATURE: 97 F | WEIGHT: 239 LBS | RESPIRATION RATE: 16 BRPM | OXYGEN SATURATION: 100 % | BODY MASS INDEX: 36.22 KG/M2

## 2021-04-06 DIAGNOSIS — L03.90 WOUND CELLULITIS: ICD-10-CM

## 2021-04-06 DIAGNOSIS — S91.302D WOUND, OPEN, FOOT WITH COMPLICATION, LEFT, SUBSEQUENT ENCOUNTER: ICD-10-CM

## 2021-04-06 PROCEDURE — 99213 OFFICE O/P EST LOW 20 MIN: CPT | Performed by: NURSE PRACTITIONER

## 2021-04-06 ASSESSMENT — ENCOUNTER SYMPTOMS
MYALGIAS: 1
CARDIOVASCULAR NEGATIVE: 1
PSYCHIATRIC NEGATIVE: 1
CONSTITUTIONAL NEGATIVE: 1
RESPIRATORY NEGATIVE: 1
NEUROLOGICAL NEGATIVE: 1

## 2021-04-06 NOTE — LETTER
[address],   [address] [address]  Phone:  [phone] - Fax:  [phone]   [facility] Progress Report and Disability Certification  Date of Service: 4/6/2021   No Show:  No  Date / Time of Next Visit:   Discharged/MMI  Released to Full Duty    Claim Information   Patient Name: Daysi Valencia Jr.  Claim Number:     Employer: [employer]  Date of Injury: 2/8/2021     Insurer / TPA: [employer]  ID / SSN:     Occupation: Stocking   Diagnosis: Diagnoses of Wound, open, foot with complication, left, subsequent encounter and Wound cellulitis were pertinent to this visit.    Medical Information   Related to Industrial Injury? Yes    Subjective Complaints:  Date of injury 2/8/2021. A pallet salvador ran over her left foot caused a wound and pain. Today significant improvement.  She reports some tightness and discomfort wearing shoes.  She denies numbness, tingling, fever, significant swelling, or warmth.  Patient has completed Bactrim, doxycycline, and Keflex without notable adverse effects.  She is no longer using ibuprofen. She is tolerating light duty without difficulty.  Wound care discharge patient with 80% tensile with instructions to continue gentle care, protect the wound site, avoid sunlight, apply sun block if exposed to sun, and apply moisturizer daily.  Patient will be released from care with anticipation resolution of symptoms. Plan of care discussed with patient.      Objective Findings: Left foot: Top of her left foot she still has a well healing 1.5 cm x 1.5 cm diameter area of closed wound, no surrounding erythema, minimal surrounding tenderness.  Distal pulses 2+ intact.  No gait abnormality.  Distal pulses 2+ intact.  No gait abnormality.    Pre-Existing Condition(s):     Assessment:   Condition Improved    Status: Discharged /  MMI  Permanent Disability:No    Plan:      Diagnostics:         Comments:  Discharged/MMI  Released to Full Duty   Follow-up if needed   Continue with wound care instructions     Disability Information   Status: Released to Full Duty    From:  4/6/2021  Through:   Restrictions are:     Physical Restrictions   Sitting:    Standing:    Stooping:    Bending:      Squatting:    Walking:    Climbing:    Pushing:      Pulling:    Other:    Reaching Above Shoulder (L):   Reaching Above Shoulder (R):       Reaching Below Shoulder (L):    Reaching Below Shoulder (R):      Not to exceed Weight Limits   Carrying(hrs):   Weight Limit(lb):   Lifting(hrs):   Weight  Limit(lb):     Comments:      Repetitive Actions   Hands: i.e. Fine Manipulations from Grasping:     Feet: i.e. Operating Foot Controls:     Driving / Operate Machinery:     Provider Name:   SANJUANA Reynolds Physician Signature:  Physician Name:     Clinic Name / Location: [address],   [address] [place] [address] Clinic Phone Number: Dept: [phone]   Appointment Time: 10:15 Am Visit Start Time: 10:15 AM   Check-In Time:  10:13 Am Visit Discharge Time:  10:51 AM   Original-Treating Physician or Chiropractor    Page 2-Insurer/TPA    Page 3-Employer    Page 4-Employee

## 2021-04-06 NOTE — PROGRESS NOTES
"Subjective:     Daysi Valencia Jr. is a 25 y.o. female who presents for Follow-Up (WC DOI: 2/8/2021 Left foot; Better RM 16)      Date of injury 2/8/2021. A pallet salvador ran over her left foot caused a wound and pain. Today significant improvement.  She reports some tightness and discomfort wearing shoes.  She denies numbness, tingling, fever, significant swelling, or warmth.  Patient has completed Bactrim, doxycycline, and Keflex without notable adverse effects.  She is no longer using ibuprofen. She is tolerating light duty without difficulty.  Wound care discharge patient with 80% tensile with instructions to continue gentle care, protect the wound site, avoid sunlight, apply sun block if exposed to sun, and apply moisturizer daily.  Patient will be released from care with anticipation resolution of symptoms. Plan of care discussed with patient.       Review of Systems   Constitutional: Negative.    Respiratory: Negative.    Cardiovascular: Negative.    Musculoskeletal: Positive for myalgias. Negative for joint pain.   Skin:        Closed wound with 80% tensile   Neurological: Negative.    Psychiatric/Behavioral: Negative.        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   FH: No pertinent family history to this problem.       Objective:     /78   Pulse 70   Temp 36.1 °C (97 °F)   Resp 16   Ht 1.727 m (5' 8\")   Wt 108 kg (239 lb)   SpO2 100%   BMI 36.34 kg/m²     Constitutional: Patient is in no acute distress. Appears well-developed and well-nourished.   Cardiovascular: Normal rate.    Pulmonary/Chest: Effort normal. No respiratory distress.   Neurological: Patient is alert and oriented to person, place, and time.   Skin: Skin is warm and dry.   Psychiatric: Normal mood and affect. Behavior is normal.     Left foot: Top of her left foot she still has a well healing 1.5 cm x 1.5 cm diameter area of closed wound, no surrounding erythema, minimal surrounding tenderness.  Distal pulses 2+ intact.  No " gait abnormality.  Distal pulses 2+ intact.  No gait abnormality.     Assessment/Plan:       1. Wound, open, foot with complication, left, subsequent encounter    2. Wound cellulitis    Released to Full Duty FROM 4/6/2021 TO         Discharged/MMI  Released to Full Duty   Follow-up if needed   Continue with wound care instructions     Differential diagnosis, natural history, supportive care, and indications for immediate follow-up discussed.    Approximately 25 minutes was spent in preparing for visit, obtaining history, exam and evaluation, patient counseling/education and post visit documentation/orders.

## 2021-07-19 ENCOUNTER — OFFICE VISIT (OUTPATIENT)
Dept: NEUROLOGY | Facility: MEDICAL CENTER | Age: 26
End: 2021-07-19
Attending: PSYCHIATRY & NEUROLOGY
Payer: OTHER GOVERNMENT

## 2021-07-19 VITALS
TEMPERATURE: 97 F | DIASTOLIC BLOOD PRESSURE: 74 MMHG | HEART RATE: 74 BPM | BODY MASS INDEX: 37.42 KG/M2 | HEIGHT: 68 IN | OXYGEN SATURATION: 97 % | SYSTOLIC BLOOD PRESSURE: 122 MMHG | WEIGHT: 246.91 LBS

## 2021-07-19 DIAGNOSIS — G43.019 INTRACTABLE MIGRAINE WITHOUT AURA AND WITHOUT STATUS MIGRAINOSUS: ICD-10-CM

## 2021-07-19 DIAGNOSIS — E66.9 OBESITY (BMI 35.0-39.9 WITHOUT COMORBIDITY): ICD-10-CM

## 2021-07-19 PROCEDURE — 99204 OFFICE O/P NEW MOD 45 MIN: CPT | Performed by: PSYCHIATRY & NEUROLOGY

## 2021-07-19 PROCEDURE — 99202 OFFICE O/P NEW SF 15 MIN: CPT | Performed by: PSYCHIATRY & NEUROLOGY

## 2021-07-19 RX ORDER — PROPRANOLOL HYDROCHLORIDE 10 MG/1
TABLET ORAL
Qty: 180 TABLET | Refills: 1 | Status: SHIPPED | OUTPATIENT
Start: 2021-07-19 | End: 2022-01-10 | Stop reason: SDUPTHER

## 2021-07-19 RX ORDER — SUMATRIPTAN 100 MG/1
TABLET, FILM COATED ORAL
Qty: 9 TABLET | Refills: 3 | Status: SHIPPED | OUTPATIENT
Start: 2021-07-19 | End: 2022-01-10 | Stop reason: SDUPTHER

## 2021-07-19 ASSESSMENT — PATIENT HEALTH QUESTIONNAIRE - PHQ9: CLINICAL INTERPRETATION OF PHQ2 SCORE: 0

## 2021-07-19 NOTE — PROGRESS NOTES
Chief Complaint   Patient presents with   • Migraine     Referred for Migraines     Patient is referred by Allen Holley M.D for initial consult.    History of present illness:  Daysi Valencia Jr. 26 y.o. female presents today for headache.   History is obtained from patient.  and Patient is accompanied by self.   She works for [employer].    Duration/timing: duration of up to 24 hours, 15/30 headache days a month at the least, onset about 2017  Context: Headache: she can wake up with the headaches. Worse at night.   Location: bifrontal   Quality: pulsating  Severity: moderate to severe  Modifying factors: see med list below  Associated signs/symptoms: worse with lights/heat, some snoring, she can get flashing lights with blurring   Denies: bladder incontinence, bowel incontinence, vision changes/loss or diplopia, head trauma , weakness, numbness/tingling, swallowing difficulties, speech disturbance, hearing loss, loss of consciousness, hallucinations, seizures and falls, hx head trauma, catamenial qualities     Patient has tried:  -Sertraline - for depression  -Promethazine - for nausea  -Zomig - no benefit taken appropriately  -Toradol IM - makes her drowsy and helped her sleep without abortive effect  -Amitriptyline - 10mg daily for 2 months with intermittent efficacy - made headaches less frequent  -Ibuprofen - stopped working  -Excedrin - some benefit but ineffective sometimes, over 10     Not sexually active.     Past medical history:   Past Medical History:   Diagnosis Date   • Migraine        Past surgical history:   History reviewed. No pertinent surgical history.    Family history:   Family History   Problem Relation Age of Onset   • Diabetes Maternal Aunt    • Alcohol abuse Paternal Aunt        Social history:   Tobacco Use   • Smoking status: Never Smoker   • Smokeless tobacco: Never Used   Vaping Use   • Vaping Use: Never used   Substance and Sexual Activity   • Alcohol use: Never   • Drug use:  "Never     Current medications:   Current Outpatient Medications   Medication   • sertraline (ZOLOFT) 25 MG tablet   • loratadine (CLARITIN) 10 MG Tab     No current facility-administered medications for this visit.       Medication Allergy:  No Known Allergies    ROS -per HPI    Physical examination:   Vitals:    07/19/21 0826   BP: 122/74   BP Location: Left arm   Patient Position: Sitting   BP Cuff Size: Adult long   Pulse: 74   Temp: 36.1 °C (97 °F)   TempSrc: Temporal   SpO2: 97%   Weight: 112 kg (246 lb 14.6 oz)   Height: 1.727 m (5' 8\")     General: Patient in well nourished in no apparent distress. Elevated BMI.   HENT: Normocephalic, atraumatic. Mallapatic score 2-3  Cardiovascular: No lower extremity edema.  Respiratory: Normal respiratory effort.   Psychiatric: Pleasant.     NEUROLOGICAL EXAM:   Mental status: Awake, alert and fully oriented to person, place, time and situation. Normal attention, concentration and fund of knowledge for education level.   Speech and language: Speech is fluent without errors and clear.  Cranial nerve exam:  II: Pupils are equally round and reactive to light. Visual fields are intact by confrontation.  III, IV, VI: EOMI, no diplopia, no ptosis.  V: Sensation to light touch is normal over V1-3 distributions bilaterally.  .  VII: Facial movements are symmetrical. There is no facial droop. .  VIII: Hearing intact to soft speech and finger rub bilaterally  IX: Palate elevates symmetrically, uvula is midline. Dysarthria is not present.  XI: Shoulder shrug are symmetrical and strong.   XII: Tongue protrudes midline.    Motor exam:  Muscle tone is normal in all 4 limbs. and No abnormal movements appreciated.    Muscle strength:     Right  Left  Deltoid   5/5  5/5      Biceps   5/5  5/5  Triceps  5/5  5/5   Wrist extensors 5/5  5/5  Wrist flexors  5/5  5/5     5/5  5/5  Interossei  5/5  5/5  Thenar (APB)  NT/5  NT/5   Hip " flexors  5/5  5/5  Quadriceps  5/5  5/5   Adduction  NT/5  NT/5  Abduction  NT/5  NT/5   Hamstrings  5/5  5/5  Dorsiflexors  5/5  5/5  Plantarflexors  5/5  5/5  Toe extension  NT/5  NT/5  Foot inversion  NT/5  NT/5  Foot eversion  NT/5  NT/5  NT = not tested    Sensory exam:  Intact to Light touch and Temperature in bilateral upper and lower extremity.    Reflexes:       Right  Left  Biceps   1/4  1/4  Triceps  1/4  1/4  Brachioradialis 1/4  1/4  Knee jerk  1/4 1/4  Ankle jerk  0/4  0/4   bilateral toes are downgoing to plantar stimulation..    Coordination: shows a normal finger-nose-finger and heel to shin bilaterally.   Gait: Casual gait is normal.      ANCILLARY DATA REVIEWED:   Lab Data Review:  None  Imaging: None  Records reviewed: Referral note personally reviewed.        ASSESSMENT AND PLAN:     1. Intractable migraine without aura and without status migrainosus: Patient presenting with classic migraine symptoms of high frequency.  Possible medication overuse headache as well.  No red flag symptoms or focal neurological exam findings to suggest secondary etiology.  Will hold off on further head imaging at this point in time unless inconsistent history.  She has responded somewhat to preventative therapy, though not at goal.  Discussed treatment options including but not limited to preventative/abortive therapy, topiramate versus abortive therapy alone.  Given her frequency she is agreeable to preventative therapy.  - propranolol (INDERAL) 10 MG Tab; 5mg BID for one week and if tolerated increase to 10mg BID  Dispense: 180 tablet; Refill: 1  - sumatriptan (IMITREX) 100 MG tablet; One tablet PRN migraine, ok to repeat in 2 hours if no benefit. Max dose is 200mg in 24 hours  Dispense: 9 tablet; Refill: 3  -Headache diary    2. Medication overuse headache: Based on clinical history, patient was counseled.     FOLLOW-UP: Return in about 3 months (around 10/19/2021) for virtual .  Medication management      EDUCATION AND COUNSELING:  -I personally discussed the following with the patient:   Risks/benefits/side effects/alternatives of medication including but not limited to drowsiness, sedation, dizziness, increased risk for falls, cardiovascular effects (hypotension, cardiac arrhythmias, death), weight changes, GI side effects (gastritis, ulcers, bleeding, changes in appetite, pancreatitis, change in bowel habits), increased risk for depression, anxiety, suicide, psychosis and mood changes and avoid pregnancy while taking prescribed drugs unless discussed with physician (fetal side effects include: congenital malformations, developmental and intellectual disability)., Diagnosis, prognosis, and treatment options discussed with patient at length.   and Medical reasoning as above      This dictation was created using voice recognition software. I have made every reasonable attempt to avoid dictation errors, but this document may contain an error not identified before finalizing. If the error changes the accuracy of the document, I would appreciate it being brought to my attention. Thank you.      Jacqueline Pisano MD  Neurology

## 2022-01-04 ENCOUNTER — APPOINTMENT (OUTPATIENT)
Dept: NEUROLOGY | Facility: MEDICAL CENTER | Age: 27
End: 2022-01-04
Attending: PSYCHIATRY & NEUROLOGY
Payer: OTHER GOVERNMENT

## 2022-01-10 ENCOUNTER — TELEMEDICINE (OUTPATIENT)
Dept: NEUROLOGY | Facility: MEDICAL CENTER | Age: 27
End: 2022-01-10
Attending: PSYCHIATRY & NEUROLOGY
Payer: OTHER GOVERNMENT

## 2022-01-10 VITALS — HEIGHT: 68 IN | BODY MASS INDEX: 35.46 KG/M2 | WEIGHT: 234 LBS

## 2022-01-10 DIAGNOSIS — G43.019 INTRACTABLE MIGRAINE WITHOUT AURA AND WITHOUT STATUS MIGRAINOSUS: ICD-10-CM

## 2022-01-10 PROCEDURE — 99214 OFFICE O/P EST MOD 30 MIN: CPT | Mod: GT | Performed by: PSYCHIATRY & NEUROLOGY

## 2022-01-10 RX ORDER — SUMATRIPTAN 100 MG/1
TABLET, FILM COATED ORAL
Qty: 9 TABLET | Refills: 3 | Status: SHIPPED | OUTPATIENT
Start: 2022-01-10 | End: 2022-04-07 | Stop reason: SDUPTHER

## 2022-01-10 RX ORDER — PROPRANOLOL HYDROCHLORIDE 10 MG/1
TABLET ORAL
Qty: 180 TABLET | Refills: 3 | Status: SHIPPED | OUTPATIENT
Start: 2022-01-10 | End: 2022-05-10

## 2022-01-10 ASSESSMENT — PATIENT HEALTH QUESTIONNAIRE - PHQ9: CLINICAL INTERPRETATION OF PHQ2 SCORE: 0

## 2022-01-10 NOTE — PROGRESS NOTES
Chief Complaint   Patient presents with   • Follow-Up     migraine     This evaluation was conducted via Zoom using secure and encrypted videoconferencing technology. The patient was in a private location in the state of [place].    The patient's identity was confirmed and verbal consent was obtained for this virtual visit.    History of present illness:  Daysi Valencia Jr. 26 y.o. female presents today for headache.   History is obtained from patient.  and Patient is accompanied by self.   She works for [employer].    Duration/timing: duration of up to 24 hours, 15/30 headache days a month at the least, onset about 2017  Context: Headache: she can wake up with the headaches. Worse at night.   Location: bifrontal   Quality: pulsating  Severity: moderate to severe  Modifying factors: see med list below  Associated signs/symptoms: worse with lights/heat, some snoring, she can get flashing lights with blurring   Denies: bladder incontinence, bowel incontinence, vision changes/loss or diplopia, head trauma , weakness, numbness/tingling, swallowing difficulties, speech disturbance, hearing loss, loss of consciousness, hallucinations, seizures and falls, hx head trauma, catamenial qualities     Patient has tried:  -Sertraline - for depression  -Promethazine - for nausea  -Zomig - no benefit taken appropriately  -Toradol IM - makes her drowsy and helped her sleep without abortive effect  -Amitriptyline - 10mg daily for 2 months with intermittent efficacy - made headaches less frequent  -Ibuprofen - stopped working  -Excedrin - some benefit but ineffective sometimes, over 10   -Propranolol - improvement of frequency at 20mg daily dose  -Sumatriptan - with good abortive benefit at 100mg       Subjective: Patient was last seen in neurology clinic on 7/2021.     Migraines: she is doing well. With the propranolol 2 headaches every month for the last three months. The sumatriptan helps with resolution of headache. No side  "effects to the medications. No lightheadedness. Pulse has been 88 BPM. She has some diarrhea for two months that has since resolved. Not sexually active. No hospitalizations.     Past medical history:   Past Medical History:   Diagnosis Date   • Migraine        Past surgical history:   History reviewed. No pertinent surgical history.    Family history:   Family History   Problem Relation Age of Onset   • Diabetes Maternal Aunt    • Alcohol abuse Paternal Aunt        Social history:   Tobacco Use   • Smoking status: Never Smoker   • Smokeless tobacco: Never Used   Vaping Use   • Vaping Use: Never used   Substance and Sexual Activity   • Alcohol use: Never   • Drug use: Never     Current medications:   Current Outpatient Medications   Medication   • propranolol (INDERAL) 10 MG Tab   • sumatriptan (IMITREX) 100 MG tablet   • sertraline (ZOLOFT) 25 MG tablet   • loratadine (CLARITIN) 10 MG Tab     No current facility-administered medications for this visit.       Medication Allergy:  No Known Allergies    ROS -per HPI    Physical examination:   Vitals:    01/10/22 1126   Weight: 106 kg (234 lb)   Height: 1.727 m (5' 8\")     Prior exam as detailed below. On today's exam, patient was Oriented to person, place, time, and purpose, Demonstrated normal attention and Speech is fluent without errors.    General: Patient in well nourished in no apparent distress. Elevated BMI.   HENT: Normocephalic, atraumatic. Mallapatic score 2-3  Cardiovascular: No lower extremity edema.  Respiratory: Normal respiratory effort.   Psychiatric: Pleasant.     NEUROLOGICAL EXAM:   Mental status: Awake, alert and fully oriented to person, place, time and situation. Normal attention, concentration and fund of knowledge for education level.   Speech and language: Speech is fluent without errors and clear.  Cranial nerve exam:  II: Pupils are equally round and reactive to light. Visual fields are intact by confrontation.  III, IV, VI: EOMI, no " diplopia, no ptosis.  V: Sensation to light touch is normal over V1-3 distributions bilaterally.  .  VII: Facial movements are symmetrical. There is no facial droop. .  VIII: Hearing intact to soft speech and finger rub bilaterally  IX: Palate elevates symmetrically, uvula is midline. Dysarthria is not present.  XI: Shoulder shrug are symmetrical and strong.   XII: Tongue protrudes midline.    Motor exam:  Muscle tone is normal in all 4 limbs. and No abnormal movements appreciated.    Muscle strength:     Right  Left  Deltoid   5/5  5/5      Biceps   5/5  5/5  Triceps  5/5  5/5   Wrist extensors 5/5  5/5  Wrist flexors  5/5  5/5     5/5  5/5  Interossei  5/5  5/5  Thenar (APB)  NT/5  NT/5   Hip flexors  5/5  5/5  Quadriceps  5/5  5/5   Adduction  NT/5  NT/5  Abduction  NT/5  NT/5   Hamstrings  5/5  5/5  Dorsiflexors  5/5  5/5  Plantarflexors  5/5  5/5  Toe extension  NT/5  NT/5  Foot inversion  NT/5  NT/5  Foot eversion  NT/5  NT/5  NT = not tested    Sensory exam:  Intact to Light touch and Temperature in bilateral upper and lower extremity.    Reflexes:       Right  Left  Biceps   1/4  1/4  Triceps  1/4  1/4  Brachioradialis 1/4  1/4  Knee jerk  1/4  1/4  Ankle jerk  0/4  0/4   bilateral toes are downgoing to plantar stimulation..    Coordination: shows a normal finger-nose-finger and heel to shin bilaterally.   Gait: Casual gait is normal.      ANCILLARY DATA REVIEWED:   Lab Data Review:  None  Imaging: None  Records reviewed: None        ASSESSMENT AND PLAN:     1. Chronic migraine without aura and without status migrainosus, improved: Patient presenting with classic migraine symptoms of high frequency.  No red flag symptoms or focal neurological exam findings to suggest secondary etiology.  Will hold off on further head imaging at this point in time unless inconsistent history/exam.  Responding well to treatment.   -Continue propranolol (INDERAL) 10 MG Tab; 10mg BID  -Continue sumatriptan (IMITREX) 100 MG  tablet; One tablet PRN migraine, ok to repeat in 2 hours if no benefit. Max dose is 200mg in 24 hours  Dispense: 9 tablet; Refill: 3  -She will see if PCP is willing to continue her medications, if so she would like to continue following her primary care and return to me if there is any particular worsening    FOLLOW-UP: Return in about 1 year (around 1/10/2023).  Or sooner if needed    EDUCATION AND COUNSELING:  -I personally discussed the following with the patient:   Risks/benefits/side effects/alternatives of medication including but not limited to avoid pregnancy while taking prescribed drugs unless discussed with physician (fetal side effects include: congenital malformations, developmental and intellectual disability)., Diagnosis, prognosis, and treatment options discussed with patient at length.   and Medical reasoning as above      This dictation was created using voice recognition software. I have made every reasonable attempt to avoid dictation errors, but this document may contain an error not identified before finalizing. If the error changes the accuracy of the document, I would appreciate it being brought to my attention. Thank you.      Jacqueline Pisano MD  Neurology

## 2022-05-10 ENCOUNTER — OFFICE VISIT (OUTPATIENT)
Dept: NEUROLOGY | Facility: MEDICAL CENTER | Age: 27
End: 2022-05-10
Attending: PSYCHIATRY & NEUROLOGY
Payer: OTHER GOVERNMENT

## 2022-05-10 VITALS
SYSTOLIC BLOOD PRESSURE: 122 MMHG | BODY MASS INDEX: 37.98 KG/M2 | HEART RATE: 98 BPM | TEMPERATURE: 96.4 F | OXYGEN SATURATION: 99 % | WEIGHT: 250.6 LBS | DIASTOLIC BLOOD PRESSURE: 68 MMHG | HEIGHT: 68 IN

## 2022-05-10 DIAGNOSIS — G43.019 INTRACTABLE MIGRAINE WITHOUT AURA AND WITHOUT STATUS MIGRAINOSUS: ICD-10-CM

## 2022-05-10 PROCEDURE — 99214 OFFICE O/P EST MOD 30 MIN: CPT | Performed by: PSYCHIATRY & NEUROLOGY

## 2022-05-10 PROCEDURE — 99211 OFF/OP EST MAY X REQ PHY/QHP: CPT | Performed by: PSYCHIATRY & NEUROLOGY

## 2022-05-10 RX ORDER — PROPRANOLOL HYDROCHLORIDE 10 MG/1
TABLET ORAL
Qty: 360 TABLET | Refills: 1 | Status: SHIPPED | OUTPATIENT
Start: 2022-05-10

## 2022-05-10 RX ORDER — LORATADINE 10 MG/1
TABLET ORAL
COMMUNITY
Start: 2016-01-26

## 2022-05-10 RX ORDER — SUMATRIPTAN 100 MG/1
TABLET, FILM COATED ORAL
Qty: 9 TABLET | Refills: 2 | Status: SHIPPED | OUTPATIENT
Start: 2022-05-10

## 2022-05-10 RX ORDER — ONDANSETRON 4 MG/1
TABLET, ORALLY DISINTEGRATING ORAL
COMMUNITY
Start: 2022-04-10

## 2022-05-10 NOTE — PROGRESS NOTES
Chief Complaint   Patient presents with   • Follow-Up     Headaches       History of present illness:  Daysi Valencia Jr. 26 y.o. female presents today for headache f/u.   History is obtained from patient.  and Patient is accompanied by self.   She works for the [employer].    Duration/timing: duration of up to 24 hours, 15/30 headache days a month at the least, onset about 2017  Context: Headache: she can wake up with the headaches. Worse at night.   Location: bifrontal   Quality: pulsating  Severity: moderate to severe  Modifying factors: see med list below  Associated signs/symptoms: worse with lights/heat, some snoring, she can get flashing lights with blurring   Denies: bladder incontinence, bowel incontinence, vision changes/loss or diplopia, head trauma , weakness, numbness/tingling, swallowing difficulties, speech disturbance, hearing loss, loss of consciousness, hallucinations, seizures and falls, hx head trauma, catamenial qualities     Patient has tried:  -Sertraline - for depression  -Promethazine - for nausea  -Zomig - no benefit taken appropriately  -Toradol IM - makes her drowsy and helped her sleep without abortive effect  -Amitriptyline - 10mg daily for 2 months with intermittent efficacy - made headaches less frequent  -Ibuprofen - stopped working  -Excedrin - some benefit but ineffective sometimes, over 10   -Propranolol - improvement of frequency at 20mg daily dose  -Sumatriptan - with good abortive benefit at 100mg       Subjective: Patient was last seen in neurology clinic on 1/2022 via telemedicine.    Migraines: one month after we had our visit she had vomiting followed by her typical migraine. This one headache lasted for two weeks. Responding to triptan but not at goal. No new medications or other changes at the time but she did start a 2nd job and associated with sleep deprivation.  She took a month hiatus of her second job with improvement of her headaches.  Since then she has been at  "work for about 4 days and has only had last two headaches were her typical migraines lasting for 6 hours and the following one similar.     Tylenol and benedryl without benefit Sumatrrobert can work and hold off her headaaches. No fevers, weakness numbnerss, falls, or persistent vision changes.  Continues to tolerate propranolol well without any side effects.  Not sexually active.    Past medical history:   Past Medical History:   Diagnosis Date   • Migraine        Past surgical history:   History reviewed. No pertinent surgical history.    Family history:   Family History   Problem Relation Age of Onset   • Diabetes Maternal Aunt    • Alcohol abuse Paternal Aunt        Social history:   Tobacco Use   • Smoking status: Never Smoker   • Smokeless tobacco: Never Used   Vaping Use   • Vaping Use: Never used   Substance and Sexual Activity   • Alcohol use: Never   • Drug use: Never     Current medications:   Current Outpatient Medications   Medication   • loratadine (CLARITIN) 10 MG Tab   • ondansetron (ZOFRAN ODT) 4 MG TABLET DISPERSIBLE   • sumatriptan (IMITREX) 100 MG tablet   • propranolol (INDERAL) 10 MG Tab   • sertraline (ZOLOFT) 25 MG tablet     No current facility-administered medications for this visit.       Medication Allergy:  No Known Allergies    ROS -per HPI    Physical examination:   Vitals:    05/10/22 0946   BP: 122/68   BP Location: Right arm   Patient Position: Sitting   BP Cuff Size: Adult   Pulse: 98   Temp: (!) 35.8 °C (96.4 °F)   TempSrc: Temporal   SpO2: 99%   Weight: 114 kg (250 lb 9.6 oz)   Height: 1.727 m (5' 8\")       General: Patient in well nourished in no apparent distress. Elevated BMI.   HENT: Normocephalic, atraumatic.   Cardiovascular: No lower extremity edema.  Respiratory: Normal respiratory effort.   Psychiatric: Pleasant.     NEUROLOGICAL EXAM:   Mental status: Awake, alert and fully oriented to person, place, time and situation. Normal attention, concentration and fund of " knowledge for education level.   Speech and language: Speech is fluent without errors and clear.  Cranial nerve exam:  II: Pupils are equally round and reactive to light. Visual fields are intact by confrontation.  III, IV, VI: EOMI, no diplopia, no ptosis.  V: Sensation to light touch is normal over V1-3 distributions bilaterally.    VII: Facial movements are symmetrical. There is no facial droop.   VIII: Hearing intact to soft speech and finger rub bilaterally  IX: Palate elevates symmetrically, uvula is midline. Dysarthria is not present.  XI: Shoulder shrug are symmetrical and strong.   XII: Tongue protrudes midline.    Motor exam:  Muscle tone is normal in all 4 limbs. and No abnormal movements appreciated.    Muscle strength:     Right  Left  Deltoid   5/5  5/5      Biceps   5/5  5/5  Triceps  5/5  5/5   Wrist extensors 5/5  5/5  Wrist flexors  5/5  5/5     5/5  5/5  Interossei  5/5  5/5  Thenar (APB)  NT/5  NT/5   Hip flexors  5/5  5/5  Quadriceps  5/5  5/5   Adduction  NT/5  NT/5  Abduction  NT/5  NT/5   Hamstrings  5/5  5/5  Dorsiflexors  5/5  5/5  Plantarflexors  5/5  5/5  Toe extension  NT/5  NT/5  Foot inversion  NT/5  NT/5  Foot eversion  NT/5  NT/5  NT = not tested    Sensory exam:  Intact to Light touch in bilateral upper and lower extremity.    Reflexes:  Stable LE reflexes     Right  Left  Biceps   1/4  1/4  Triceps  1/4  1/4  Brachioradialis 1/4  1/4  Knee jerk  1/4  1/4  Ankle jerk  0/4  0/4   bilateral toes are downgoing to plantar stimulation..    Coordination: shows a normal finger-nose-finger  Gait: Narrow-based gait      ANCILLARY DATA REVIEWED:   Lab Data Review:  None  Imaging: None  Records reviewed: None        ASSESSMENT AND PLAN:     1. Chronic migraine without aura, with recent status migrainosus: Patient with a history of classic migraines and a recent bout of status migrainosus.  Repeat neurological exam without unexplained findings, no other red flag symptoms.  Triggers include  her second job and sleep deprivation.  Adjustment of medication further optimization.  -Increase propranolol 10mg tablet to 20mg BID via slow taper   -Continue sumatriptan (IMITREX) 100 MG tablet; One tablet PRN migraine, ok to repeat in 2 hours if no benefit. Max dose is 200mg in 24 hours  Dispense: 9 tablet; Refill: 2    FOLLOW-UP: Return in about 3 months (around 8/10/2022).  Medication management    EDUCATION AND COUNSELING:  -I personally discussed the following with the patient:   Risks/benefits/side effects/alternatives of medication including but not limited to avoid pregnancy while taking prescribed drugs unless discussed with physician (fetal side effects include: congenital malformations, developmental and intellectual disability)., Diagnosis, prognosis, and treatment options discussed with patient at length.   and Medical reasoning as above      This dictation was created using voice recognition software. I have made every reasonable attempt to avoid dictation errors, but this document may contain an error not identified before finalizing. If the error changes the accuracy of the document, I would appreciate it being brought to my attention. Thank you.      Jacqueline Pisano MD  Neurology